# Patient Record
Sex: MALE | Race: WHITE | NOT HISPANIC OR LATINO
[De-identification: names, ages, dates, MRNs, and addresses within clinical notes are randomized per-mention and may not be internally consistent; named-entity substitution may affect disease eponyms.]

---

## 2021-06-28 PROBLEM — Z00.00 ENCOUNTER FOR PREVENTIVE HEALTH EXAMINATION: Status: ACTIVE | Noted: 2021-06-28

## 2021-06-30 ENCOUNTER — NON-APPOINTMENT (OUTPATIENT)
Age: 21
End: 2021-06-30

## 2021-06-30 ENCOUNTER — APPOINTMENT (OUTPATIENT)
Dept: HEART AND VASCULAR | Facility: CLINIC | Age: 21
End: 2021-06-30
Payer: COMMERCIAL

## 2021-06-30 VITALS
BODY MASS INDEX: 22.93 KG/M2 | SYSTOLIC BLOOD PRESSURE: 98 MMHG | HEART RATE: 58 BPM | DIASTOLIC BLOOD PRESSURE: 53 MMHG | HEIGHT: 73 IN | WEIGHT: 173 LBS | TEMPERATURE: 97 F

## 2021-06-30 DIAGNOSIS — Z78.9 OTHER SPECIFIED HEALTH STATUS: ICD-10-CM

## 2021-06-30 PROCEDURE — 99072 ADDL SUPL MATRL&STAF TM PHE: CPT

## 2021-06-30 PROCEDURE — 93282 PRGRMG EVAL IMPLANTABLE DFB: CPT

## 2021-06-30 PROCEDURE — 99205 OFFICE O/P NEW HI 60 MIN: CPT | Mod: 25

## 2021-07-04 ENCOUNTER — APPOINTMENT (OUTPATIENT)
Dept: HEART AND VASCULAR | Facility: CLINIC | Age: 21
End: 2021-07-04
Payer: COMMERCIAL

## 2021-07-04 PROCEDURE — 93244 EXT ECG>48HR<7D REV&INTERPJ: CPT

## 2021-07-07 NOTE — REASON FOR VISIT
[New Patient Device Check] : is here today for a new patient device check visit for [Parent] : parent

## 2021-07-09 NOTE — REVIEW OF SYSTEMS
[Negative] : Heme/Lymph [Fever] : no fever [Chills] : no chills [Feeling Fatigued] : not feeling fatigued [SOB] : no shortness of breath [Dyspnea on exertion] : not dyspnea during exertion [Palpitations] : no palpitations [Syncope] : no syncope [Cough] : no cough

## 2021-07-09 NOTE — HISTORY OF PRESENT ILLNESS
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  "This gene encodes integrin alpha 7- a membrane glycoprotein which is present in skeletal muscle.  Common  features of this form a congenital muscular dystrophy include proximal muscle weakness.  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  Exercise stress test in  with PVCs throughout exercise.  Cardiac MRI in  showed R/L ventricular volumes were at the upper limits of normal and the LV function was at the lower limits of normal.  No evidence of arrhythmogenic RV dysplasia.  No late gadolinium enhancement was  noted.  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  \par In  holter showed VT with rates up to 180 with short R-R intervals.  He underwent a SQ ICE 2018.  \par Significant family history of sudden cardiac death - Paternal uncle with cardiomyopathy  of VT at 41 y/o, 17 year old paternal cousin with sudden cardiac death.  52 year old uncle with cardiomyopathy  of VT.  He had a brother who  in his sleep.  Other family members with ICD and heart block.\par \par He has an ILR with episodes of nonsustained VT - however 2 months ago he was walking with friends and became lightheaded and hot and then received an ICD shock. Since then his Nadolol was changed to Sotalol.  No further ICD shocks.  No chest pain, SOB, syncope, near syncope, \par \par  \par

## 2021-07-09 NOTE — DISCUSSION/SUMMARY
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  No events since his known ICD shock 2 months ago.  Episodes reviewed on ILR and SQ ICD. Had frequent epsiodes of non sustained VT, at least 2 morphologies. The episode he was shocked for started as non sustained episode, self terminated and then reinitiated. None of the episodes appear to initiate with a single PVC.  We discussed that given his genetic testing was over 4 years ago I would like him to see a cardiac  again to get tested to see if we can gain any further insight into the etiology of his arrhythmias.  We discussed that Sotalol is not my choice for arrhythmia suppression given potential QT prolongation, however since iinitiation there are no more episodes of NSVT and therefore I plan to continue for now. He has an ICD and I have asked him to wear a event monitor to count his PVCs and see if these initiate non sustained episodes.  After we review this we will discuss potentially changing him back to a beta blocker.  I have asked him to get a repeat cardiac MRI to assess for any new scar that could be the etiology of his VT.  We discussed that if he has a frequent predominant PVC or any scar on MRI we can discuss an ablation - but from the information we have currently an ablation would not be warranted (at least 2 morphologies and prior cardiac MRI with no scar- however this was more then 4 years ago and should be reassessed).  SQ ICDs are MRI compatible (will need to be reprogrammed) and an ILR is MRI compatible.  We discussed this in great detail and they are in agreement with the plan.  THey will follow up after his MRI and event monitor or sooner if needed and knows to call with any questions or concerns.

## 2021-07-09 NOTE — PHYSICAL EXAM
[General Appearance - Well Developed] : well developed [Normal Appearance] : normal appearance [Well Groomed] : well groomed [General Appearance - Well Nourished] : well nourished [No Deformities] : no deformities [General Appearance - In No Acute Distress] : no acute distress [Heart Rate And Rhythm] : heart rate and rhythm were normal [Heart Sounds] : normal S1 and S2 [] : no respiratory distress [Respiration, Rhythm And Depth] : normal respiratory rhythm and effort [Exaggerated Use Of Accessory Muscles For Inspiration] : no accessory muscle use [Auscultation Breath Sounds / Voice Sounds] : lungs were clear to auscultation bilaterally [Clean] : clean [Dry] : dry [Well-Healed] : well-healed [Cyanosis, Localized] : no localized cyanosis [Palpable Crepitus] : no palpable crepitus [Bleeding] : no active bleeding [Foul Odor] : no foul smell [Purulent Drainage] : no purulent drainage [Serosanguineous Drainage] : no serosanquineous drainage [Serous Drainage] : no serous drainage [Erythema] : not erythematous [Warm] : not warm [Tender] : not tender [Indurated] : not indurated [Fluctuant] : not fluctuant

## 2021-07-13 ENCOUNTER — APPOINTMENT (OUTPATIENT)
Dept: MRI IMAGING | Facility: HOSPITAL | Age: 21
End: 2021-07-13

## 2021-07-28 ENCOUNTER — APPOINTMENT (OUTPATIENT)
Dept: CARDIOLOGY | Facility: CLINIC | Age: 21
End: 2021-07-28
Payer: COMMERCIAL

## 2021-07-28 PROCEDURE — 99244 OFF/OP CNSLTJ NEW/EST MOD 40: CPT | Mod: GT

## 2021-07-28 NOTE — PHYSICAL EXAM
[Extraocular Movements Intact] : extraocular movements were intact [Normal] : normal palmar creases without syndactyly or other anomalies

## 2021-07-29 NOTE — REVIEW OF SYSTEMS
[Palpitations] : palpitations [Exercise Intolerance] : no exercise intolerance [Muscle Weakness] : no muscle weakness [Muscle Pain] : no muscle pain [Joint Pain] : no joint pain [Negative] : Neurological [FreeTextEntry9] : see hpi

## 2021-07-29 NOTE — REASON FOR VISIT
[FreeTextEntry3] : PRISCILLA HARDEN  is being seen  for an initial consultation at the Cardiogenomics Program at [facility] on 07/28/2021.   Mr. HARDEN was referred by  Dr. Abrams for hereditary cardiac predisposition risk assessment and counseling, due to FH SCD\par

## 2021-07-29 NOTE — FAMILY HISTORY
[FreeTextEntry1] : FamilyHistory_20_twCiteListControlStart FamilyHistory_20_twCiteListControlEnd [id number] \par A four-generation family history was constructed and scanned into [employer]. \par Family history is significant for: \par siblings\par 22 yo brother- no med probs - no children\par 20 yo sister - no med probs- no children\par \par Mother 46 yo healthy carrier of ITGA7 mutation\par Maternal aunts x3\par 39 yo- 8 yo daughter, 5 yo son\par 34 yo-7 yo  son, 5 yo son ,7 yo daughter\par 30s aunt hx cerebral palsy contractures\par Maternal uncles x2\par 38 yo uncle - 1 yo daughter,  daughter\par 31 yo uncle familial Mediterranean fever mutations homozygous on genetic testing, asymptomatic - no children \par \par Maternal Grandmother 70s HLD, DM\par Maternal Grandfather 70s HLD , DM\par \par Father 61 yo no med probs- nml cardiac workup\par Paternal aunts\par aunt dec 60s Afib and COPD smoker- 3 sons and 1 daughter\par aunt 60s no med prob- no children\par aunt 60s no med prob- no children\par Paternal uncles\par uncle dec 43 yo SCD, hx Cardiomyopathy DCM,  was a surgeon, diagnosed in his 30s- one night passed away in his sleep- 4 children\par 25 yo son, 23 yo son , 22 yo daughter, 23 yodaughter\par uncle dec 53 yo DCM, had a defibrillator, diag at age 30, hyperthyroidism (medication) post thyroidectomy- 7 daughters and 1 son\par uncle 70 yo DM , HTN- no children\par uncle 63 yo dec multiple myeloma- son, son , daughter\par \par Paternal Grandfather dec 74 yo\par Paternal Grandmother dec 92 yo CAD, DM\par \par children: none\par \par his maternal families originate from [place] and paternal families originate from indicate country or region. \par No Ashkenazi Oriental orthodox ancestry. \par Family history was positive for consanguinity  parents are distant cousins\par No family history of SIDS\par  \par \par  [FreeTextEntry2] : [place]  [FreeTextEntry3] : [place]

## 2021-07-29 NOTE — HISTORY OF PRESENT ILLNESS
[FreeTextEntry1] : PRISCILLA HARDEN is a 20 yo M PMH monomorhpic PVCs, NSVT , cMRI Lv upper limits of nml and LV function lower limits of normal, hx TIA s/p PFO closure\par he was found prior to be homozygous for pathogenic variants in ITGA7 gene which is associated with autosomal recessive congenital muscular dystrophy , this was initially done due to a finding of rhabdomyolysis in 2015, swollen arms and abs was lifting weights that day for football, unable to complete workout that day  assoc with brown urine \par normal prior intensity but had not worked out for a month or so prior\par \par now has rhabdo anytime he exercises \par \par hx growth hormone deficiency  received growth hormone prior (initial rhabdo while on growth hormone)\par \par he had prior genetic testing whole exome in 2015 that identified homozygous pathogenic variants in ITGA7 c. 2357 +1G>A , both his parents were carriers\par this variant has now been reclassified to variant of uncertain significance (VUS) \par no deletion/duplication analysis was performed\par \par He also had a re-analysis by Dr ware PKN2 new mutation identified dysfunctional- will reach out to her for further evaluation\par \par prior on nadalol, \par ICD fired recently changed to sotalol\par  walking with friends and became lightheaded and hot and then received an ICD shock.\par [facility]\par \par today he is referred for a cardiogenomic evaluation

## 2021-08-09 ENCOUNTER — FORM ENCOUNTER (OUTPATIENT)
Age: 21
End: 2021-08-09

## 2021-11-19 ENCOUNTER — APPOINTMENT (OUTPATIENT)
Dept: CARDIOLOGY | Facility: CLINIC | Age: 21
End: 2021-11-19
Payer: COMMERCIAL

## 2021-11-19 ENCOUNTER — NON-APPOINTMENT (OUTPATIENT)
Age: 21
End: 2021-11-19

## 2021-11-19 VITALS
HEIGHT: 73 IN | OXYGEN SATURATION: 99 % | SYSTOLIC BLOOD PRESSURE: 108 MMHG | BODY MASS INDEX: 23.46 KG/M2 | WEIGHT: 177 LBS | HEART RATE: 63 BPM | DIASTOLIC BLOOD PRESSURE: 70 MMHG

## 2021-11-19 PROCEDURE — 99204 OFFICE O/P NEW MOD 45 MIN: CPT

## 2021-11-19 PROCEDURE — 93000 ELECTROCARDIOGRAM COMPLETE: CPT

## 2021-12-03 NOTE — FAMILY HISTORY
[FreeTextEntry1] : FamilyHistory_20_twCiteListControlStart FamilyHistory_20_twCiteListControlEnd [id number] \par A four-generation family history was constructed and scanned into [employer]. \par Family history is significant for: \par siblings\par 22 yo brother- no med probs - no children\par 18 yo sister - no med probs- no children\par \par Mother 46 yo healthy carrier of ITGA7 mutation\par Maternal aunts x3\par 39 yo- 6 yo daughter, 3 yo son\par 34 yo-5 yo  son, 3 yo son ,7 yo daughter\par 30s aunt hx cerebral palsy contractures\par Maternal uncles x2\par 40 yo uncle - 3 yo daughter,  daughter\par 31 yo uncle familial Mediterranean fever mutations homozygous on genetic testing, asymptomatic - no children \par \par Maternal Grandmother 70s HLD, DM\par Maternal Grandfather 70s HLD , DM\par \par Father 61 yo no med probs- nml cardiac workup\par Paternal aunts\par aunt dec 60s Afib and COPD smoker- 3 sons and 1 daughter\par aunt 60s no med prob- no children\par aunt 60s no med prob- no children\par Paternal uncles\par uncle dec 41 yo SCD, hx Cardiomyopathy DCM,  was a surgeon, diagnosed in his 30s- one night passed away in his sleep- 4 children\par 25 yo son, 23 yo son , 22 yo daughter, 23 yodaughter\par uncle dec 51 yo DCM, had a defibrillator, diag at age 30, hyperthyroidism (medication) post thyroidectomy- 7 daughters and 1 son\par uncle 68 yo DM , HTN- no children\par uncle 61 yo dec multiple myeloma- son, son , daughter\par \par Paternal Grandfather dec 74 yo\par Paternal Grandmother dec 92 yo CAD, DM\par \par children: none\par \par his maternal families originate from [place] and paternal families originate from indicate country or region. \par No Ashkenazi Mandaen ancestry. \par Family history was positive for consanguinity  parents are distant cousins\par No family history of SIDS\par  \par \par  [FreeTextEntry2] : [place]  [FreeTextEntry3] : [place]

## 2021-12-03 NOTE — REVIEW OF SYSTEMS
[Palpitations] : palpitations [Negative] : Neurological [Exercise Intolerance] : no exercise intolerance [Muscle Weakness] : no muscle weakness [Muscle Pain] : no muscle pain [Joint Pain] : no joint pain [FreeTextEntry9] : see hpi

## 2021-12-03 NOTE — HISTORY OF PRESENT ILLNESS
[FreeTextEntry1] : PRISCILLA HARDEN is a 20 yo M PMH monomorphic PVCs, NSVT , cMRI Lv upper limits of nml and LV function lower limits of normal, hx TIA s/p PFO closure\par he was found prior to be homozygous for pathogenic variants in ITGA7 gene which is associated with autosomal recessive congenital muscular dystrophy , this was initially done due to a finding of rhabdomyolysis in 2015, swollen arms and abs was lifting weights that day for football, unable to complete workout that day  assoc with brown urine \par normal prior intensity but had not worked out for a month or so prior\par \par now has rhabdo anytime he exercises \par \par hx growth hormone deficiency  received growth hormone prior (initial rhabdo while on growth hormone)\par \par he had prior genetic testing whole exome in 2015 that identified homozygous pathogenic variants in ITGA7 c. 2357 +1G>A , both his parents were carriers\par this variant has now been reclassified to variant of uncertain significance (VUS) \par no deletion/duplication analysis was performed\par \par He also had a re-analysis by Dr Kevin PKN2 new mutation identified dysfunctional- will reach out to her for further evaluation\par \par prior on nadalol, \par ICD fired recently changed to sotalol\par  walking with friends and became lightheaded and hot and then received an ICD shock.\par recent ECHO nml at [facility]\par \par he underwent genetic testing and today presents for results

## 2021-12-03 NOTE — REASON FOR VISIT
[FreeTextEntry3] : PRISCILLA HARDEN  was seen  for an initial consultation at the Cardiogenomics Program at [facility] on 07/28/2021.   Mr. HARDEN was referred by  Dr. Abrams for hereditary cardiac predisposition risk assessment and counseling, due to FH SCD\par

## 2021-12-26 NOTE — PROCEDURE
Routine Reassessment [No] : not [ICD] : Implantable cardioverter-defibrillator [de-identified] : [employer] A219 Emblm MRI S-ICD\par Battery 61% to DORIS\par last VT 5/2021\par \par Medtronc reveal LINQ\par episodes of NSVT - at least 2 morphologies - 2 episodes start with PVC

## 2021-12-29 ENCOUNTER — NON-APPOINTMENT (OUTPATIENT)
Age: 21
End: 2021-12-29

## 2021-12-29 ENCOUNTER — APPOINTMENT (OUTPATIENT)
Dept: HEART AND VASCULAR | Facility: CLINIC | Age: 21
End: 2021-12-29
Payer: COMMERCIAL

## 2021-12-29 VITALS
BODY MASS INDEX: 22 KG/M2 | TEMPERATURE: 97.5 F | HEIGHT: 73 IN | SYSTOLIC BLOOD PRESSURE: 107 MMHG | WEIGHT: 166 LBS | DIASTOLIC BLOOD PRESSURE: 62 MMHG | HEART RATE: 67 BPM

## 2021-12-29 PROCEDURE — 93282 PRGRMG EVAL IMPLANTABLE DFB: CPT

## 2021-12-29 PROCEDURE — 99215 OFFICE O/P EST HI 40 MIN: CPT | Mod: 25

## 2022-01-21 ENCOUNTER — OUTPATIENT (OUTPATIENT)
Dept: OUTPATIENT SERVICES | Facility: HOSPITAL | Age: 22
LOS: 1 days | Discharge: HOME | End: 2022-01-21
Payer: COMMERCIAL

## 2022-01-21 PROCEDURE — 33286 RMVL SUBQ CAR RHYTHM MNTR: CPT | Mod: 59

## 2022-01-21 PROCEDURE — 33285 INSJ SUBQ CAR RHYTHM MNTR: CPT

## 2022-01-21 PROCEDURE — C1764: CPT

## 2022-01-25 NOTE — HISTORY OF PRESENT ILLNESS
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  "This gene encodes integrin alpha 7- a membrane glycoprotein which is present in skeletal muscle.  Common  features of this form a congenital muscular dystrophy include proximal muscle weakness.  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  Exercise stress test in  with PVCs throughout exercise.  Cardiac MRI in  showed R/L ventricular volumes were at the upper limits of normal and the LV function was at the lower limits of normal.  No evidence of arrhythmogenic RV dysplasia.  No late gadolinium enhancement was  noted.  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  \par In  holter showed VT with rates up to 180 with short R-R intervals.  He underwent a SQ ICE 2018.  \par Significant family history of sudden cardiac death - Paternal uncle with cardiomyopathy  of VT at 41 y/o, 17 year old paternal cousin with sudden cardiac death.  52 year old uncle with cardiomyopathy  of VT.  He had a brother who  in his sleep.  Other family members with ICD and heart block.\par \par He has an ILR with episodes of nonsustained VT.  2021 he was walking with friends and became lightheaded and hot and then received an ICD shock. Since then his Nadolol was changed to Sotalol.  No further ICD shocks.  No chest pain, SOB, syncope, near syncope, or device related complaints. \par \par

## 2022-01-25 NOTE — PHYSICAL EXAM
[Normal Appearance] : normal appearance [General Appearance - Well Developed] : well developed [Well Groomed] : well groomed [General Appearance - Well Nourished] : well nourished [No Deformities] : no deformities [General Appearance - In No Acute Distress] : no acute distress [Heart Rate And Rhythm] : heart rate and rhythm were normal [Heart Sounds] : normal S1 and S2 [] : no respiratory distress [Respiration, Rhythm And Depth] : normal respiratory rhythm and effort [Exaggerated Use Of Accessory Muscles For Inspiration] : no accessory muscle use [Auscultation Breath Sounds / Voice Sounds] : lungs were clear to auscultation bilaterally [Clean] : clean [Dry] : dry [Well-Healed] : well-healed [Palpable Crepitus] : no palpable crepitus [Bleeding] : no active bleeding [Foul Odor] : no foul smell [Purulent Drainage] : no purulent drainage [Serosanguineous Drainage] : no serosanquineous drainage [Serous Drainage] : no serous drainage [Erythema] : not erythematous [Warm] : not warm [Tender] : not tender [Indurated] : not indurated [Fluctuant] : not fluctuant

## 2022-01-25 NOTE — PROCEDURE
[No] : not [ICD] : Implantable cardioverter-defibrillator [de-identified] : [employer] A219 Emblm MRI S-ICD\par Battery 61% to DORIS\par no events since last check\par \par Medtronc reveal LINQ\par 3 episodes of NSVT - all <30 seconds

## 2022-01-25 NOTE — DISCUSSION/SUMMARY
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 There are no further ICD therapies, however ILR shows short episodes of monomorphic NSVT that he is unaware of. Episodes do not start with PVC and we discussed that at this point ablation for VT/PVC has unclear benefit. \par \par We discussed development of a left anterior hemiblock and how this could progress to more significant conduction abnormality. I explained that his current system does not have pacing capabilities. \par \par A cardiac MRI would have been useful to reevaluate his substrate however, given subcutaneous device and expected artifact, this could have been non diagnostic at this point. \par \par I have recommended they get a second opinion from a specialist who sees more patients with myotonic dystrophy in [place] Dr. Gimenez [phone].  He will continue Sotalol for now.  IN ther future given known conduction disease (LAFB) and likelihood of this progressing and possible benefit from ATP will consider SQ explant and transvenous device.  He will follow up in 3 months or sooner if needed and knows to call with any questions or concerns.  \par

## 2022-01-25 NOTE — REVIEW OF SYSTEMS
[Negative] : Heme/Lymph [Fever] : no fever [Chills] : no chills [Feeling Fatigued] : not feeling fatigued [SOB] : no shortness of breath [Dyspnea on exertion] : not dyspnea during exertion [Chest Discomfort] : no chest discomfort [Palpitations] : no palpitations [Orthopnea] : no orthopnea [Syncope] : no syncope [Cough] : no cough

## 2022-01-27 DIAGNOSIS — I47.2 VENTRICULAR TACHYCARDIA: ICD-10-CM

## 2022-01-27 DIAGNOSIS — T82.518A BREAKDOWN (MECHANICAL) OF OTHER CARDIAC AND VASCULAR DEVICES AND IMPLANTS, INITIAL ENCOUNTER: ICD-10-CM
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
## 2022-03-02 ENCOUNTER — APPOINTMENT (OUTPATIENT)
Dept: HEART AND VASCULAR | Facility: CLINIC | Age: 22
End: 2022-03-02
Payer: COMMERCIAL

## 2022-03-02 ENCOUNTER — NON-APPOINTMENT (OUTPATIENT)
Age: 22
End: 2022-03-02

## 2022-03-02 PROCEDURE — 93298 REM INTERROG DEV EVAL SCRMS: CPT

## 2022-03-02 PROCEDURE — G2066: CPT

## 2022-03-15 ENCOUNTER — APPOINTMENT (OUTPATIENT)
Dept: NEUROLOGY | Facility: CLINIC | Age: 22
End: 2022-03-15
Payer: COMMERCIAL

## 2022-03-15 VITALS
BODY MASS INDEX: 21.87 KG/M2 | DIASTOLIC BLOOD PRESSURE: 66 MMHG | SYSTOLIC BLOOD PRESSURE: 100 MMHG | WEIGHT: 165 LBS | HEIGHT: 73 IN | HEART RATE: 64 BPM

## 2022-03-15 DIAGNOSIS — G72.9 MYOPATHY, UNSPECIFIED: ICD-10-CM

## 2022-03-15 DIAGNOSIS — M62.82 RHABDOMYOLYSIS: ICD-10-CM

## 2022-03-15 PROCEDURE — 99214 OFFICE O/P EST MOD 30 MIN: CPT

## 2022-03-16 PROBLEM — G72.9 MYOPATHY: Status: ACTIVE | Noted: 2022-03-16

## 2022-03-16 PROBLEM — M62.82 NON-TRAUMATIC RHABDOMYOLYSIS: Status: ACTIVE | Noted: 2022-03-16

## 2022-03-16 NOTE — DISCUSSION/SUMMARY
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 but clinically he does not exhibit any muscle dysfunction with normal muscle biopsy and appears to be a genetic disorder predisposing him to rhabdomyolysis and his father being a physician is fully aware of it and treat him symptomatically and was advised to refrain from any heavy exercises and remain under the care of his cardiologist for treatment of ventricular tachycardia.\par \par I had a gil discussion with his father that I do not have any therapeutic suggestions at this time as there is no neurological dysfunction particularly there is no evidence of clinical expression of muscle disease and meanwhile I expressed my appearance regarding the finding genetic testing and its association with muscle disorder nevertheless it appears that there may be a link for congenital myopathy or muscular dystrophy which has been not characterized either by muscle biopsy or by clinical evaluation and the aim of treatment is to prevent rhabdomyolysis and to refrain from any heavy exercise meanwhile I will assess  and will discuss it with his father.  I think he should remain under the care of the neuromuscular department at [facility] where they have all facilities to further investigate him including mitochondrial disorders and he understands.  Education and counseling was provided follow-up evaluation only on a as needed basis should there be a need.

## 2022-03-16 NOTE — HISTORY OF PRESENT ILLNESS
[FreeTextEntry1] : Mr. Jolley is a 21-year-old male who accompanied his father Dr. Jolley and narrated a long history as follows\par \par The patient's father gave a detailed history that at age 7 the patient had decreased growth hormone and was evaluated by endocrinologist and was treated with growth hormone and it worked and he achieved full growth and at age 13 he had an attack of severe rhabdomyolysis with CPK as high as over 200,000 units and was treated and thereafter he had frequent rhabdomyolysis upon exercising and usually his father treats him with IV hydration and had no renal dysfunction and his muscle enzymes are normal when he does not exercise and usually between exercises his CPK is around 400 units and a muscle biopsy in 2017 was reported to be normal however he developed ventricular tachycardia requiring subcutaneous defibrillator 2 years ago and initially had shock therapy and now in sotalol treatment.\par \par Besides episodes of rhabdomyolysis and ventricular tachycardia there is no other pertinent past medical history other than growth hormone deficiency discovered at age 7 and treated appropriately.  There is no other pertinent past history but family history is extensive with history of cardiomyopathy and on the paternal side there is significant cardiac disease.  He has no adverse habits and his medications were reviewed and father informed me that he is traveling to [place] for cardiac checkup in April 2022 to discuss the matter of ventricular tachycardia.  He was also extensively investigated by geneticists and extensive report is available but there has not been any correlation with a known muscle disorder as there are sparse cases of the genetic defect.  I reviewed the genetic diagnosis

## 2022-03-16 NOTE — PHYSICAL EXAM
[FreeTextEntry1] : General examination was unremarkable.  Head neck, ear nose and throat was unremarkable.  There was no carotid bruit, thyromegaly or lymphadenopathy.  Chest was clear and heart sounds were normal and I noted the subcutaneous defibrillator on the left chest wall.  Neck was supple peripheral pulsations were preserved there was no muscle tenderness neck and lumbar spine movements were normal and there is no kyphoscoliosis.\par \par Neurological evaluation was completely normal. [General Appearance - Alert] : alert [General Appearance - In No Acute Distress] : in no acute distress [Oriented To Time, Place, And Person] : oriented to person, place, and time [Affect] : the affect was normal [Impaired Insight] : insight and judgment were intact [Person] : oriented to person [Place] : oriented to place [Time] : oriented to time [Concentration Intact] : normal concentrating ability [Naming Objects] : no difficulty naming common objects [Visual Intact] : visual attention was ~T not ~L decreased [Repeating Phrases] : no difficulty repeating a phrase [Writing A Sentence] : no difficulty writing a sentence [Fluency] : fluency intact [Comprehension] : comprehension intact [Reading] : reading intact [Past History] : adequate knowledge of personal past history [Cranial Nerves Optic (II)] : visual acuity intact bilaterally,  visual fields full to confrontation, pupils equal round and reactive to light [Cranial Nerves Oculomotor (III)] : extraocular motion intact [Cranial Nerves Trigeminal (V)] : facial sensation intact symmetrically [Cranial Nerves Facial (VII)] : face symmetrical [Cranial Nerves Vestibulocochlear (VIII)] : hearing was intact bilaterally [Cranial Nerves Glossopharyngeal (IX)] : tongue and palate midline [Cranial Nerves Accessory (XI - Cranial And Spinal)] : head turning and shoulder shrug symmetric [Cranial Nerves Hypoglossal (XII)] : there was no tongue deviation with protrusion [Motor Tone] : muscle tone was normal in all four extremities [Motor Strength] : muscle strength was normal in all four extremities [No Muscle Atrophy] : normal bulk in all four extremities [Sensation Tactile Decrease] : light touch was intact [Abnormal Walk] : normal gait [Balance] : balance was intact [Past-pointing] : there was no past-pointing [Tremor] : no tremor present [2+] : Ankle jerk left 2+ [Plantar Reflex Right Only] : normal on the right [Plantar Reflex Left Only] : normal on the left

## 2022-03-16 NOTE — DATA REVIEWED
[de-identified] : Ankle MRI on 9/5/2014 showed bone marrow edema.  General examination was unremarkable [de-identified] : There are several genetic testing and one undertaken on 10/10/2015 recognized I TG A7 describing as possibly associated with congenital muscular atrophy or dystrophy which is autosomal recessive and is a variant of radius gene products and mitochondrial DNA test was negative.  Covid test was negative.  And other genetic testing undertaken for periodic fever syndromeOn 10/20/2021 was also undertaken.\par \par Muscle biopsy report from [facility] pathology consisting of left biceps biopsy on 6/29/2015 described scattered atrophic fibers which were nonspecific and otherwise the biopsy was unremarkable.  There are reports of [facility] physician records dated 11/2/2017 which delineated a detailed history for follow-up for ventricular tachycardia including history of rhabdomyolysis PVCs and I reviewed the cardiac evaluation by Dr. Mathur and was status post percutaneous patent foramen ovale closure and s subcutaneous device for cardiac defibrillator.  I also reviewed the lab tests which were unremarkable and his CPK was 223 on 9/10/2018.  Ankle MRI

## 2022-04-06 ENCOUNTER — NON-APPOINTMENT (OUTPATIENT)
Age: 22
End: 2022-04-06

## 2022-04-06 ENCOUNTER — APPOINTMENT (OUTPATIENT)
Dept: HEART AND VASCULAR | Facility: CLINIC | Age: 22
End: 2022-04-06
Payer: COMMERCIAL

## 2022-04-06 PROCEDURE — G2066: CPT

## 2022-04-06 PROCEDURE — 93298 REM INTERROG DEV EVAL SCRMS: CPT

## 2022-05-11 ENCOUNTER — APPOINTMENT (OUTPATIENT)
Dept: HEART AND VASCULAR | Facility: CLINIC | Age: 22
End: 2022-05-11
Payer: COMMERCIAL

## 2022-05-11 ENCOUNTER — NON-APPOINTMENT (OUTPATIENT)
Age: 22
End: 2022-05-11

## 2022-05-11 PROCEDURE — G2066: CPT

## 2022-05-11 PROCEDURE — 93298 REM INTERROG DEV EVAL SCRMS: CPT

## 2022-06-14 ENCOUNTER — NON-APPOINTMENT (OUTPATIENT)
Age: 22
End: 2022-06-14

## 2022-06-14 ENCOUNTER — APPOINTMENT (OUTPATIENT)
Dept: HEART AND VASCULAR | Facility: CLINIC | Age: 22
End: 2022-06-14
Payer: COMMERCIAL

## 2022-06-14 PROCEDURE — 93298 REM INTERROG DEV EVAL SCRMS: CPT

## 2022-06-14 PROCEDURE — G2066: CPT

## 2022-07-06 ENCOUNTER — APPOINTMENT (OUTPATIENT)
Dept: HEART AND VASCULAR | Facility: CLINIC | Age: 22
End: 2022-07-06

## 2022-07-06 VITALS
HEIGHT: 72 IN | DIASTOLIC BLOOD PRESSURE: 58 MMHG | HEART RATE: 58 BPM | SYSTOLIC BLOOD PRESSURE: 99 MMHG | WEIGHT: 165 LBS | BODY MASS INDEX: 22.35 KG/M2

## 2022-07-06 PROCEDURE — 93282 PRGRMG EVAL IMPLANTABLE DFB: CPT

## 2022-07-06 PROCEDURE — 99213 OFFICE O/P EST LOW 20 MIN: CPT

## 2022-07-12 NOTE — ADDENDUM
[FreeTextEntry1] :  \par I, Dr. Samuel Abrams, personally performed the evaluation and management (E/M) services for this established patient who presents today with (an) existing condition(s). That E/M includes conducting the examination, assessing all conditions, and (re)establishing/reinforcing a plan of care. Today, my ACP, Janes MILLER, was here to observe my evaluation and management services for this condition to be followed going forward.\par

## 2022-07-12 NOTE — DISCUSSION/SUMMARY
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 There are no further ICD therapies since his last visit and no events on ILR.  He is experiencing short bursts of palpitations and his father requested that the detection metrics be lowered to try and catch what he is feeling.   In the past episodes do not start with PVC and we discussed that at this point ablation for VT/PVC has unclear benefit. \par \par We discussed development of a left anterior hemiblock and how this could progress to more significant conduction abnormality. I explained that his current system does not have pacing capabilities.  In the future he may benefit from a transvenous system or addition of a MICRA once [employer] device is available - for both ATP and possible pacing. \par \par A cardiac MRI would have been useful to reevaluate his substrate however, given subcutaneous device and expected artifact, this could have been non diagnostic at this point. \par \par He will continue Sotalol for now.  He will follow up in 3 months or sooner if needed and knows to call with any questions or concerns.  \par

## 2022-07-12 NOTE — PHYSICAL EXAM
[General Appearance - Well Developed] : well developed [Normal Appearance] : normal appearance [Well Groomed] : well groomed [General Appearance - Well Nourished] : well nourished [No Deformities] : no deformities [General Appearance - In No Acute Distress] : no acute distress [Heart Rate And Rhythm] : heart rate and rhythm were normal [Heart Sounds] : normal S1 and S2 [] : no respiratory distress [Respiration, Rhythm And Depth] : normal respiratory rhythm and effort [Exaggerated Use Of Accessory Muscles For Inspiration] : no accessory muscle use [Clean] : clean [Dry] : dry [Well-Healed] : well-healed [Edema] : no peripheral edema present [Palpable Crepitus] : no palpable crepitus [Bleeding] : no active bleeding [Foul Odor] : no foul smell [Purulent Drainage] : no purulent drainage [Serosanguineous Drainage] : no serosanquineous drainage [Serous Drainage] : no serous drainage [Erythema] : not erythematous [Warm] : not warm [Tender] : not tender [Indurated] : not indurated [Fluctuant] : not fluctuant

## 2022-07-12 NOTE — PROCEDURE
[No] : not [ICD] : Implantable cardioverter-defibrillator [de-identified] : [employer] A219 Emblm MRI S-ICD\par Battery 61% to DORIS\par no events since last check\par \par [employer] reveal LINQ\par no events since last check

## 2022-07-12 NOTE — HISTORY OF PRESENT ILLNESS
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  "This gene encodes integrin alpha 7- a membrane glycoprotein which is present in skeletal muscle.  Common  features of this form a congenital muscular dystrophy include proximal muscle weakness.  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  Exercise stress test in  with PVCs throughout exercise.  Cardiac MRI in  showed R/L ventricular volumes were at the upper limits of normal and the LV function was at the lower limits of normal.  No evidence of arrhythmogenic RV dysplasia.  No late gadolinium enhancement was  noted.  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  \par In  holter showed VT with rates up to 180 with short R-R intervals.  He underwent a SQ ICE 2018.  \par Significant family history of sudden cardiac death - Paternal uncle with cardiomyopathy  of VT at 43 y/o, 17 year old paternal cousin with sudden cardiac death.  52 year old uncle with cardiomyopathy  of VT.  He had a brother who  in his sleep.  Other family members with ICD and heart block.\par \par He has an ILR with episodes of nonsustained VT.  2021 he was walking with friends and became lightheaded and hot and then received an ICD shock. Since then his Nadolol was changed to Sotalol.  No further ICD shocks.  No chest pain, SOB, syncope, near syncope, or device related complaints. He does note short bursts of palpitations. \par \par

## 2022-08-10 ENCOUNTER — NON-APPOINTMENT (OUTPATIENT)
Age: 22
End: 2022-08-10

## 2022-08-10 ENCOUNTER — APPOINTMENT (OUTPATIENT)
Dept: HEART AND VASCULAR | Facility: CLINIC | Age: 22
End: 2022-08-10

## 2022-08-10 PROCEDURE — G2066: CPT

## 2022-08-10 PROCEDURE — 93298 REM INTERROG DEV EVAL SCRMS: CPT

## 2022-09-14 ENCOUNTER — APPOINTMENT (OUTPATIENT)
Dept: HEART AND VASCULAR | Facility: CLINIC | Age: 22
End: 2022-09-14

## 2022-09-14 ENCOUNTER — NON-APPOINTMENT (OUTPATIENT)
Age: 22
End: 2022-09-14

## 2022-09-14 PROCEDURE — G2066: CPT

## 2022-09-14 PROCEDURE — 93298 REM INTERROG DEV EVAL SCRMS: CPT

## 2022-10-19 ENCOUNTER — NON-APPOINTMENT (OUTPATIENT)
Age: 22
End: 2022-10-19

## 2022-10-19 ENCOUNTER — APPOINTMENT (OUTPATIENT)
Dept: HEART AND VASCULAR | Facility: CLINIC | Age: 22
End: 2022-10-19

## 2022-10-19 PROCEDURE — 93298 REM INTERROG DEV EVAL SCRMS: CPT

## 2022-10-19 PROCEDURE — G2066: CPT

## 2022-11-23 ENCOUNTER — NON-APPOINTMENT (OUTPATIENT)
Age: 22
End: 2022-11-23

## 2022-11-23 ENCOUNTER — APPOINTMENT (OUTPATIENT)
Dept: HEART AND VASCULAR | Facility: CLINIC | Age: 22
End: 2022-11-23

## 2022-11-23 PROCEDURE — 93298 REM INTERROG DEV EVAL SCRMS: CPT

## 2022-11-23 PROCEDURE — G2066: CPT

## 2022-12-08 ENCOUNTER — FORM ENCOUNTER (OUTPATIENT)
Age: 22
End: 2022-12-08

## 2022-12-09 ENCOUNTER — OUTPATIENT (OUTPATIENT)
Dept: OUTPATIENT SERVICES | Facility: HOSPITAL | Age: 22
LOS: 1 days | End: 2022-12-09
Payer: COMMERCIAL

## 2022-12-09 DIAGNOSIS — I47.20 VENTRICULAR TACHYCARDIA, UNSPECIFIED: ICD-10-CM

## 2022-12-09 PROCEDURE — 93306 TTE W/DOPPLER COMPLETE: CPT | Mod: 26

## 2022-12-09 PROCEDURE — 93306 TTE W/DOPPLER COMPLETE: CPT

## 2022-12-29 ENCOUNTER — NON-APPOINTMENT (OUTPATIENT)
Age: 22
End: 2022-12-29

## 2022-12-29 ENCOUNTER — APPOINTMENT (OUTPATIENT)
Dept: HEART AND VASCULAR | Facility: CLINIC | Age: 22
End: 2022-12-29
Payer: COMMERCIAL

## 2022-12-29 PROCEDURE — G2066: CPT

## 2022-12-29 PROCEDURE — 93298 REM INTERROG DEV EVAL SCRMS: CPT

## 2023-01-11 ENCOUNTER — APPOINTMENT (OUTPATIENT)
Dept: HEART AND VASCULAR | Facility: CLINIC | Age: 23
End: 2023-01-11

## 2023-02-02 ENCOUNTER — NON-APPOINTMENT (OUTPATIENT)
Age: 23
End: 2023-02-02

## 2023-02-02 ENCOUNTER — APPOINTMENT (OUTPATIENT)
Dept: HEART AND VASCULAR | Facility: CLINIC | Age: 23
End: 2023-02-02
Payer: SELF-PAY

## 2023-02-02 PROCEDURE — 93298 REM INTERROG DEV EVAL SCRMS: CPT

## 2023-02-02 PROCEDURE — G2066: CPT

## 2023-03-09 ENCOUNTER — NON-APPOINTMENT (OUTPATIENT)
Age: 23
End: 2023-03-09

## 2023-03-09 ENCOUNTER — APPOINTMENT (OUTPATIENT)
Dept: HEART AND VASCULAR | Facility: CLINIC | Age: 23
End: 2023-03-09
Payer: COMMERCIAL

## 2023-03-09 PROCEDURE — 93298 REM INTERROG DEV EVAL SCRMS: CPT

## 2023-03-09 PROCEDURE — G2066: CPT

## 2023-04-13 ENCOUNTER — APPOINTMENT (OUTPATIENT)
Dept: HEART AND VASCULAR | Facility: CLINIC | Age: 23
End: 2023-04-13
Payer: COMMERCIAL

## 2023-04-13 ENCOUNTER — NON-APPOINTMENT (OUTPATIENT)
Age: 23
End: 2023-04-13

## 2023-04-13 PROCEDURE — G2066: CPT

## 2023-04-13 PROCEDURE — 93298 REM INTERROG DEV EVAL SCRMS: CPT

## 2023-05-18 ENCOUNTER — APPOINTMENT (OUTPATIENT)
Dept: HEART AND VASCULAR | Facility: CLINIC | Age: 23
End: 2023-05-18
Payer: COMMERCIAL

## 2023-05-18 ENCOUNTER — NON-APPOINTMENT (OUTPATIENT)
Age: 23
End: 2023-05-18

## 2023-05-18 PROCEDURE — 93298 REM INTERROG DEV EVAL SCRMS: CPT

## 2023-05-18 PROCEDURE — G2066: CPT

## 2023-06-22 ENCOUNTER — APPOINTMENT (OUTPATIENT)
Dept: HEART AND VASCULAR | Facility: CLINIC | Age: 23
End: 2023-06-22
Payer: COMMERCIAL

## 2023-06-22 ENCOUNTER — NON-APPOINTMENT (OUTPATIENT)
Age: 23
End: 2023-06-22

## 2023-06-22 PROCEDURE — 93298 REM INTERROG DEV EVAL SCRMS: CPT

## 2023-06-22 PROCEDURE — G2066: CPT

## 2023-07-23 ENCOUNTER — NON-APPOINTMENT (OUTPATIENT)
Age: 23
End: 2023-07-23

## 2023-07-24 ENCOUNTER — APPOINTMENT (OUTPATIENT)
Dept: HEART AND VASCULAR | Facility: CLINIC | Age: 23
End: 2023-07-24
Payer: COMMERCIAL

## 2023-07-24 PROCEDURE — G2066: CPT

## 2023-07-24 PROCEDURE — 93298 REM INTERROG DEV EVAL SCRMS: CPT

## 2023-08-24 ENCOUNTER — NON-APPOINTMENT (OUTPATIENT)
Age: 23
End: 2023-08-24

## 2023-08-24 ENCOUNTER — APPOINTMENT (OUTPATIENT)
Dept: HEART AND VASCULAR | Facility: CLINIC | Age: 23
End: 2023-08-24
Payer: COMMERCIAL

## 2023-08-25 PROCEDURE — 93298 REM INTERROG DEV EVAL SCRMS: CPT

## 2023-08-25 PROCEDURE — G2066: CPT

## 2023-08-30 ENCOUNTER — APPOINTMENT (OUTPATIENT)
Dept: HEART AND VASCULAR | Facility: CLINIC | Age: 23
End: 2023-08-30
Payer: COMMERCIAL

## 2023-08-30 ENCOUNTER — NON-APPOINTMENT (OUTPATIENT)
Age: 23
End: 2023-08-30

## 2023-08-30 VITALS
TEMPERATURE: 97.3 F | BODY MASS INDEX: 23.03 KG/M2 | SYSTOLIC BLOOD PRESSURE: 102 MMHG | WEIGHT: 170 LBS | HEART RATE: 50 BPM | HEIGHT: 72 IN | DIASTOLIC BLOOD PRESSURE: 64 MMHG

## 2023-08-30 DIAGNOSIS — I45.4 NONSPECIFIC INTRAVENTRICULAR BLOCK: ICD-10-CM

## 2023-08-30 PROCEDURE — 93282 PRGRMG EVAL IMPLANTABLE DFB: CPT

## 2023-08-30 PROCEDURE — 99213 OFFICE O/P EST LOW 20 MIN: CPT | Mod: 25

## 2023-09-06 PROBLEM — I45.4 IVCD (INTRAVENTRICULAR CONDUCTION DEFECT): Status: ACTIVE | Noted: 2023-09-06

## 2023-09-06 RX ORDER — SOTALOL HYDROCHLORIDE 80 MG/1
80 TABLET ORAL TWICE DAILY
Qty: 90 | Refills: 2 | Status: ACTIVE | COMMUNITY
Start: 2021-06-30

## 2023-09-06 NOTE — PHYSICAL EXAM
[General Appearance - Well Developed] : well developed [Normal Appearance] : normal appearance [Well Groomed] : well groomed [General Appearance - Well Nourished] : well nourished [No Deformities] : no deformities [General Appearance - In No Acute Distress] : no acute distress [Heart Rate And Rhythm] : heart rate and rhythm were normal [Heart Sounds] : normal S1 and S2 [Edema] : no peripheral edema present [] : no respiratory distress [Respiration, Rhythm And Depth] : normal respiratory rhythm and effort [Exaggerated Use Of Accessory Muscles For Inspiration] : no accessory muscle use [Clean] : clean [Dry] : dry [Well-Healed] : well-healed [Palpable Crepitus] : no palpable crepitus [Bleeding] : no active bleeding [Foul Odor] : no foul smell [Purulent Drainage] : no purulent drainage [Serosanguineous Drainage] : no serosanquineous drainage [Serous Drainage] : no serous drainage [Erythema] : not erythematous [Warm] : not warm [Tender] : not tender [Indurated] : not indurated [Fluctuant] : not fluctuant

## 2023-09-06 NOTE — REVIEW OF SYSTEMS
[Fever] : no fever [Weight Gain (___ Lbs)] : no recent weight gain [Chills] : no chills [Feeling Fatigued] : not feeling fatigued [Weight Loss (___ Lbs)] : no recent weight loss [SOB] : no shortness of breath [Dyspnea on exertion] : not dyspnea during exertion [Chest Discomfort] : no chest discomfort [Orthopnea] : no orthopnea [Syncope] : no syncope [Cough] : no cough [Negative] : Heme/Lymph

## 2023-09-06 NOTE — PROCEDURE
[No] : not [ICD] : Implantable cardioverter-defibrillator [de-identified] : [employer] A219 Emb MRI S-ICD  no events since last check  [facility] no events since last check

## 2023-09-06 NOTE — DISCUSSION/SUMMARY
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  In the past review of the event reveals that the monomorphic ventricular tachycardia continued for a few beats post shock, raising concerns about DFT. There are no further ICD therapies since his last visit and only very brief NSVT ILR.  He is experiencing short bursts of palpitations and his father requested that the detection metrics be lowered to try and catch what he is feeling. This was done.  In the past episodes do not start with PVC and we discussed that at this point ablation for VT/PVC has unclear benefit.   We discussed development of a left anterior hemiblock and how this could progress to more significant conduction abnormality. I explained that his current system does not have pacing capabilities.  In the future he may benefit from a transvenous system or addition of a MICRA once [employer] device is available - for both ATP and possible pacing.   RTO in 6 months.

## 2023-09-06 NOTE — HISTORY OF PRESENT ILLNESS
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  As per prior pediatric cardiology note he had genetic testing (back in ) that showed a homozygous for a pathogenic variant in the ITGA7 gene which leads to autosomal recessive congenital muscular dystrophy.  "This gene encodes integrin alpha 7- a membrane glycoprotein which is present in skeletal muscle.  Common  features of this form a congenital muscular dystrophy include proximal muscle weakness.  No pathologic variant was detected that was known to be associated with disorders of mitochondrial metabolism".  As per notes in  he wore a holter with 322 monomorphic PVCs.  Exercise stress test in  with PVCs throughout exercise.  Cardiac MRI in  showed R/L ventricular volumes were at the upper limits of normal and the LV function was at the lower limits of normal.  No evidence of arrhythmogenic RV dysplasia.  No late gadolinium enhancement was  noted.  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  He had a TIA in  s/p PFO closure.   In  holter showed VT with rates up to 180 with short R-R intervals.  He underwent a SQ ICD 2018.   Significant family history of sudden cardiac death - Paternal uncle with cardiomyopathy  of VT at 41 y/o, 17 year old paternal cousin with sudden cardiac death.  52 year old uncle with cardiomyopathy  of VT.  He had a brother who  in his sleep.  Other family members with ICD and heart block.  He has an ILR with episodes of nonsustained VT.  2021 he was walking with friends and became lightheaded and hot and then received an ICD shock. Since then his Nadolol was changed to Sotalol.  No further ICD shocks.  He presents today with his Father. Reports that he feels fine. No chest pain, palpitations, SOB, syncope, near syncope, or device related complaints.

## 2023-09-06 NOTE — END OF VISIT
[FreeTextEntry3] : I, Beverley Cortés, am scribing for (in the presence of) Dr. Abrams the following sections: HPI, PMH,Family/social history, ROS, Physical Exam, Assessment / Plan.   I, Samuel Abrams, personally performed the services described in the documentation, reviewed the documentation recorded by the scribe in my presence and it accurately and completely records my words and actions.

## 2023-09-29 ENCOUNTER — APPOINTMENT (OUTPATIENT)
Dept: HEART AND VASCULAR | Facility: CLINIC | Age: 23
End: 2023-09-29
Payer: COMMERCIAL

## 2023-09-29 ENCOUNTER — NON-APPOINTMENT (OUTPATIENT)
Age: 23
End: 2023-09-29

## 2023-09-30 PROCEDURE — 93298 REM INTERROG DEV EVAL SCRMS: CPT

## 2023-09-30 PROCEDURE — G2066: CPT

## 2023-11-03 ENCOUNTER — NON-APPOINTMENT (OUTPATIENT)
Age: 23
End: 2023-11-03

## 2023-11-03 ENCOUNTER — APPOINTMENT (OUTPATIENT)
Dept: HEART AND VASCULAR | Facility: CLINIC | Age: 23
End: 2023-11-03
Payer: COMMERCIAL

## 2023-11-04 PROCEDURE — 93298 REM INTERROG DEV EVAL SCRMS: CPT | Mod: NC

## 2023-11-04 PROCEDURE — G2066: CPT

## 2023-11-29 ENCOUNTER — APPOINTMENT (OUTPATIENT)
Dept: HEART AND VASCULAR | Facility: CLINIC | Age: 23
End: 2023-11-29

## 2024-01-16 ENCOUNTER — NON-APPOINTMENT (OUTPATIENT)
Age: 24
End: 2024-01-16

## 2024-01-16 ENCOUNTER — APPOINTMENT (OUTPATIENT)
Dept: HEART AND VASCULAR | Facility: CLINIC | Age: 24
End: 2024-01-16
Payer: COMMERCIAL

## 2024-01-17 PROCEDURE — 93298 REM INTERROG DEV EVAL SCRMS: CPT

## 2024-02-20 ENCOUNTER — NON-APPOINTMENT (OUTPATIENT)
Age: 24
End: 2024-02-20

## 2024-02-20 ENCOUNTER — APPOINTMENT (OUTPATIENT)
Dept: HEART AND VASCULAR | Facility: CLINIC | Age: 24
End: 2024-02-20
Payer: COMMERCIAL

## 2024-02-20 PROCEDURE — 93298 REM INTERROG DEV EVAL SCRMS: CPT

## 2024-03-19 ENCOUNTER — LABORATORY RESULT (OUTPATIENT)
Age: 24
End: 2024-03-19

## 2024-03-19 ENCOUNTER — APPOINTMENT (OUTPATIENT)
Dept: HEART AND VASCULAR | Facility: CLINIC | Age: 24
End: 2024-03-19
Payer: COMMERCIAL

## 2024-03-19 VITALS
OXYGEN SATURATION: 95 % | WEIGHT: 170 LBS | DIASTOLIC BLOOD PRESSURE: 60 MMHG | TEMPERATURE: 98 F | HEIGHT: 72 IN | SYSTOLIC BLOOD PRESSURE: 94 MMHG | BODY MASS INDEX: 23.03 KG/M2 | HEART RATE: 81 BPM

## 2024-03-19 DIAGNOSIS — I47.20 VENTRICULAR TACHYCARDIA, UNSPECIFIED: ICD-10-CM

## 2024-03-19 DIAGNOSIS — Z86.73 PERSONAL HISTORY OF TRANSIENT ISCHEMIC ATTACK (TIA), AND CEREBRAL INFARCTION W/OUT RESIDUAL DEFICITS: ICD-10-CM

## 2024-03-19 DIAGNOSIS — Z95.810 PRESENCE OF AUTOMATIC (IMPLANTABLE) CARDIAC DEFIBRILLATOR: ICD-10-CM

## 2024-03-19 DIAGNOSIS — Q21.12 PATENT FORAMEN OVALE: ICD-10-CM

## 2024-03-19 PROCEDURE — 99214 OFFICE O/P EST MOD 30 MIN: CPT | Mod: 25

## 2024-03-19 PROCEDURE — 93261 INTERROGATE SUBQ DEFIB: CPT

## 2024-03-20 PROBLEM — Q21.12 PATENT FORAMEN OVALE: Status: RESOLVED | Noted: 2024-03-20 | Resolved: 2024-03-20

## 2024-03-20 PROBLEM — Z86.73 HISTORY OF TRANSIENT ISCHEMIC ATTACK (TIA): Status: RESOLVED | Noted: 2024-03-20 | Resolved: 2024-03-20

## 2024-03-20 RX ORDER — ASPIRIN 81 MG/1
81 TABLET ORAL DAILY
Refills: 0 | Status: ACTIVE | COMMUNITY

## 2024-03-20 RX ORDER — FOLIC ACID 1 MG/1
1 TABLET ORAL
Refills: 0 | Status: ACTIVE | COMMUNITY

## 2024-03-25 ENCOUNTER — NON-APPOINTMENT (OUTPATIENT)
Age: 24
End: 2024-03-25

## 2024-03-25 ENCOUNTER — APPOINTMENT (OUTPATIENT)
Dept: HEART AND VASCULAR | Facility: CLINIC | Age: 24
End: 2024-03-25
Payer: COMMERCIAL

## 2024-03-25 PROCEDURE — 93298 REM INTERROG DEV EVAL SCRMS: CPT

## 2024-04-03 PROBLEM — I47.20 VENTRICULAR TACHYCARDIA: Status: ACTIVE | Noted: 2021-06-30

## 2024-04-03 PROBLEM — Z95.810 ICD (IMPLANTABLE CARDIOVERTER-DEFIBRILLATOR) IN PLACE: Status: ACTIVE | Noted: 2023-09-06

## 2024-04-03 NOTE — HISTORY OF PRESENT ILLNESS
Problem: Falls - Risk of:  Goal: Will remain free from falls  Description: Will remain free from falls  Outcome: Met This Shift  Goal: Absence of physical injury  Description: Absence of physical injury  Outcome: Met This Shift     Pt assessed as a fall risk this shift. Remains free from falls and accidental injury at this time. Fall precautions in place, including falling star sign. Floor free from obstacles, and bed is locked and in lowest position. Adequate lighting provided. Pt encouraged to call before getting Out Of Bed for any need. Will continue to monitor needs during hourly rounding, and reinforce education on use of call light. Problem: Pain:  Goal: Pain level will decrease  Description: Pain level will decrease  Outcome: Ongoing  Goal: Control of acute pain  Description: Control of acute pain  Outcome: Ongoing    Pain level assessment complete. Pt rated pain at 9/10. Pt educated on pain scale and control interventions. PRN pain medication given per pt request. Pt instructed to call out with new onset of pain or unrelieved pain. Will continue to monitor. 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  At age 15 evaluation revealed frequent runs of NSVT on Holter.  Given his family Hx of cardiomyopathy and sudden cardiac death he underwent SQ-ICD and ILR implant (1/2018 @ [facility]).  He suffered VT s/p shock 5/2021.  He was treated with sotalol to suppress runs of NSVT, but the dose was decreased from 80mg to 40mg BID after he had symptoms of lightheadedness after standing.  ILR has shown brief NSVT. He presents for Device check today as his device has started beeping in the last couple days.  He feels well and denies dizziness or syncope.  Device check today shows 30% to DORIS w/ an alert that the remaining battery life is not accurate.    Cardiac MRI in 2015 showed R/L ventricular volumes were at the upper limits of normal and the LV function was at the lower limits of normal. No evidence of arrhythmogenic RV dysplasia. No late gadolinium enhancement was noted. 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

## 2024-04-03 NOTE — PROCEDURE
[No] : not [ICD] : Implantable cardioverter-defibrillator [See Device Printout] : See device printout [de-identified] : [employer] A219 [facility] MRI S-ICD no events since last check 30% until DORIS - remaining batter life is not accurate.

## 2024-04-03 NOTE — REVIEW OF SYSTEMS
[Negative] : Heme/Lymph [Fever] : no fever [Chills] : no chills [Weight Gain (___ Lbs)] : no recent weight gain [Feeling Fatigued] : not feeling fatigued [Weight Loss (___ Lbs)] : no recent weight loss [SOB] : no shortness of breath [Dyspnea on exertion] : not dyspnea during exertion [Orthopnea] : no orthopnea [Chest Discomfort] : no chest discomfort [Syncope] : no syncope [Cough] : no cough

## 2024-04-03 NOTE — PHYSICAL EXAM
[General Appearance - Well Developed] : well developed [Normal Appearance] : normal appearance [Well Groomed] : well groomed [General Appearance - Well Nourished] : well nourished [No Deformities] : no deformities [Heart Rate And Rhythm] : heart rate and rhythm were normal [General Appearance - In No Acute Distress] : no acute distress [Heart Sounds] : normal S1 and S2 [] : no respiratory distress [Edema] : no peripheral edema present [Respiration, Rhythm And Depth] : normal respiratory rhythm and effort [Exaggerated Use Of Accessory Muscles For Inspiration] : no accessory muscle use [Clean] : clean [Dry] : dry [Well-Healed] : well-healed [Palpable Crepitus] : no palpable crepitus [Bleeding] : no active bleeding [Foul Odor] : no foul smell [Serosanguineous Drainage] : no serosanquineous drainage [Purulent Drainage] : no purulent drainage [Serous Drainage] : no serous drainage [Erythema] : not erythematous [Tender] : not tender [Warm] : not warm [Fluctuant] : not fluctuant [Indurated] : not indurated

## 2024-04-03 NOTE — ADDENDUM
[FreeTextEntry1] : I, Emily Majano, am scribing for and the presence of Dr. Arreguin the following sections: HPI, PMH,Family/social history, ROS, Physical Exam, Assessment / Plan.  I, Paula Arreguin, personally performed the services described in the documentation, reviewed the documentation recorded by the scribe in my presence and it accurately and completely records my words and actions.

## 2024-04-29 ENCOUNTER — NON-APPOINTMENT (OUTPATIENT)
Age: 24
End: 2024-04-29

## 2024-04-29 ENCOUNTER — APPOINTMENT (OUTPATIENT)
Dept: HEART AND VASCULAR | Facility: CLINIC | Age: 24
End: 2024-04-29
Payer: COMMERCIAL

## 2024-04-29 PROCEDURE — 93298 REM INTERROG DEV EVAL SCRMS: CPT

## 2024-05-07 ENCOUNTER — TRANSCRIPTION ENCOUNTER (OUTPATIENT)
Age: 24
End: 2024-05-07

## 2024-06-17 ENCOUNTER — APPOINTMENT (OUTPATIENT)
Dept: HEART AND VASCULAR | Facility: CLINIC | Age: 24
End: 2024-06-17
Payer: MEDICAID

## 2024-06-17 ENCOUNTER — NON-APPOINTMENT (OUTPATIENT)
Age: 24
End: 2024-06-17

## 2024-06-17 PROCEDURE — 93298 REM INTERROG DEV EVAL SCRMS: CPT

## 2024-07-22 ENCOUNTER — NON-APPOINTMENT (OUTPATIENT)
Age: 24
End: 2024-07-22

## 2024-07-22 ENCOUNTER — APPOINTMENT (OUTPATIENT)
Dept: HEART AND VASCULAR | Facility: CLINIC | Age: 24
End: 2024-07-22
Payer: MEDICAID

## 2024-07-22 PROCEDURE — 93298 REM INTERROG DEV EVAL SCRMS: CPT

## 2024-08-26 ENCOUNTER — APPOINTMENT (OUTPATIENT)
Dept: HEART AND VASCULAR | Facility: CLINIC | Age: 24
End: 2024-08-26

## 2024-08-26 PROCEDURE — 93298 REM INTERROG DEV EVAL SCRMS: CPT

## 2024-09-26 ENCOUNTER — APPOINTMENT (OUTPATIENT)
Dept: HEART AND VASCULAR | Facility: CLINIC | Age: 24
End: 2024-09-26

## 2024-09-26 PROCEDURE — 93298 REM INTERROG DEV EVAL SCRMS: CPT

## 2024-10-31 ENCOUNTER — APPOINTMENT (OUTPATIENT)
Dept: HEART AND VASCULAR | Facility: CLINIC | Age: 24
End: 2024-10-31

## 2024-10-31 ENCOUNTER — NON-APPOINTMENT (OUTPATIENT)
Age: 24
End: 2024-10-31

## 2024-10-31 VITALS
OXYGEN SATURATION: 99 % | DIASTOLIC BLOOD PRESSURE: 62 MMHG | WEIGHT: 170 LBS | BODY MASS INDEX: 23.03 KG/M2 | HEART RATE: 63 BPM | RESPIRATION RATE: 16 BRPM | SYSTOLIC BLOOD PRESSURE: 98 MMHG | TEMPERATURE: 97.3 F | HEIGHT: 72 IN

## 2024-10-31 DIAGNOSIS — Z95.810 PRESENCE OF AUTOMATIC (IMPLANTABLE) CARDIAC DEFIBRILLATOR: ICD-10-CM

## 2024-10-31 DIAGNOSIS — I47.20 VENTRICULAR TACHYCARDIA, UNSPECIFIED: ICD-10-CM

## 2024-10-31 PROCEDURE — 99214 OFFICE O/P EST MOD 30 MIN: CPT | Mod: 25

## 2024-10-31 PROCEDURE — 93291 INTERROG DEV EVAL SCRMS IP: CPT

## 2024-10-31 PROCEDURE — 93292 WCD DEVICE INTERROGATE: CPT | Mod: 59

## 2024-10-31 PROCEDURE — 93000 ELECTROCARDIOGRAM COMPLETE: CPT | Mod: 59

## 2024-12-04 ENCOUNTER — APPOINTMENT (OUTPATIENT)
Dept: HEART AND VASCULAR | Facility: CLINIC | Age: 24
End: 2024-12-04

## 2024-12-04 PROCEDURE — 93298 REM INTERROG DEV EVAL SCRMS: CPT | Mod: NC

## 2025-01-09 ENCOUNTER — APPOINTMENT (OUTPATIENT)
Dept: HEART AND VASCULAR | Facility: CLINIC | Age: 25
End: 2025-01-09

## 2025-01-09 ENCOUNTER — NON-APPOINTMENT (OUTPATIENT)
Age: 25
End: 2025-01-09

## 2025-01-09 PROCEDURE — 93298 REM INTERROG DEV EVAL SCRMS: CPT

## 2025-02-11 ENCOUNTER — APPOINTMENT (OUTPATIENT)
Dept: HEART AND VASCULAR | Facility: CLINIC | Age: 25
End: 2025-02-11
Payer: MEDICAID

## 2025-02-11 ENCOUNTER — NON-APPOINTMENT (OUTPATIENT)
Age: 25
End: 2025-02-11

## 2025-02-11 PROCEDURE — 93298 REM INTERROG DEV EVAL SCRMS: CPT

## 2025-03-18 ENCOUNTER — NON-APPOINTMENT (OUTPATIENT)
Age: 25
End: 2025-03-18

## 2025-03-18 ENCOUNTER — APPOINTMENT (OUTPATIENT)
Dept: HEART AND VASCULAR | Facility: CLINIC | Age: 25
End: 2025-03-18
Payer: MEDICAID

## 2025-03-18 PROCEDURE — 93298 REM INTERROG DEV EVAL SCRMS: CPT

## 2025-04-22 ENCOUNTER — APPOINTMENT (OUTPATIENT)
Dept: HEART AND VASCULAR | Facility: CLINIC | Age: 25
End: 2025-04-22
Payer: COMMERCIAL

## 2025-04-22 ENCOUNTER — NON-APPOINTMENT (OUTPATIENT)
Age: 25
End: 2025-04-22

## 2025-04-22 PROCEDURE — 93296 REM INTERROG EVL PM/IDS: CPT

## 2025-04-22 PROCEDURE — 93295 DEV INTERROG REMOTE 1/2/MLT: CPT

## 2025-05-27 ENCOUNTER — APPOINTMENT (OUTPATIENT)
Dept: HEART AND VASCULAR | Facility: CLINIC | Age: 25
End: 2025-05-27
Payer: COMMERCIAL

## 2025-05-27 ENCOUNTER — NON-APPOINTMENT (OUTPATIENT)
Age: 25
End: 2025-05-27

## 2025-05-27 PROCEDURE — 93295 DEV INTERROG REMOTE 1/2/MLT: CPT

## 2025-05-27 PROCEDURE — 93296 REM INTERROG EVL PM/IDS: CPT

## 2025-07-01 ENCOUNTER — APPOINTMENT (OUTPATIENT)
Dept: HEART AND VASCULAR | Facility: CLINIC | Age: 25
End: 2025-07-01
Payer: COMMERCIAL

## 2025-07-01 ENCOUNTER — NON-APPOINTMENT (OUTPATIENT)
Age: 25
End: 2025-07-01

## 2025-07-01 PROCEDURE — 93294 REM INTERROG EVL PM/LDLS PM: CPT

## 2025-07-01 PROCEDURE — 93296 REM INTERROG EVL PM/IDS: CPT

## 2025-08-05 ENCOUNTER — NON-APPOINTMENT (OUTPATIENT)
Age: 25
End: 2025-08-05

## 2025-08-05 ENCOUNTER — APPOINTMENT (OUTPATIENT)
Dept: HEART AND VASCULAR | Facility: CLINIC | Age: 25
End: 2025-08-05

## 2025-08-05 PROCEDURE — 93298 REM INTERROG DEV EVAL SCRMS: CPT

## 2025-09-09 ENCOUNTER — NON-APPOINTMENT (OUTPATIENT)
Age: 25
End: 2025-09-09

## 2025-09-09 ENCOUNTER — APPOINTMENT (OUTPATIENT)
Dept: HEART AND VASCULAR | Facility: CLINIC | Age: 25
End: 2025-09-09

## 2025-09-09 PROCEDURE — 93298 REM INTERROG DEV EVAL SCRMS: CPT
